# Patient Record
Sex: FEMALE | Race: WHITE | NOT HISPANIC OR LATINO | URBAN - NONMETROPOLITAN AREA
[De-identification: names, ages, dates, MRNs, and addresses within clinical notes are randomized per-mention and may not be internally consistent; named-entity substitution may affect disease eponyms.]

---

## 2019-10-01 ENCOUNTER — APPOINTMENT (RX ONLY)
Dept: URBAN - NONMETROPOLITAN AREA CLINIC 31 | Facility: CLINIC | Age: 39
Setting detail: DERMATOLOGY
End: 2019-10-01

## 2019-10-01 DIAGNOSIS — B37.2 CANDIDIASIS OF SKIN AND NAIL: ICD-10-CM

## 2019-10-01 PROCEDURE — ? PRESCRIPTION

## 2019-10-01 PROCEDURE — ? COUNSELING

## 2019-10-01 RX ORDER — FLUCONAZOLE 150 MG/1
TABLET ORAL
Qty: 5 | Refills: 0 | Status: ERX | COMMUNITY
Start: 2019-10-01

## 2019-10-01 RX ADMIN — FLUCONAZOLE: 150 TABLET ORAL at 00:00

## 2019-10-01 NOTE — PROCEDURE: COUNSELING
Detail Level: Simple
Patient Specific Counseling (Will Not Stick From Patient To Patient): Confirmed with patient she is no longer taking orap due to contraindication with fluconazole. She states she “hasn’t taken in years.”

## 2020-04-03 ENCOUNTER — RX ONLY (OUTPATIENT)
Age: 40
Setting detail: RX ONLY
End: 2020-04-03

## 2020-04-03 ENCOUNTER — APPOINTMENT (RX ONLY)
Dept: URBAN - NONMETROPOLITAN AREA CLINIC 31 | Facility: CLINIC | Age: 40
Setting detail: DERMATOLOGY
End: 2020-04-03

## 2020-04-03 DIAGNOSIS — L01.01 NON-BULLOUS IMPETIGO: ICD-10-CM

## 2020-04-03 DIAGNOSIS — L21.8 OTHER SEBORRHEIC DERMATITIS: ICD-10-CM

## 2020-04-03 DIAGNOSIS — L94.2 CALCINOSIS CUTIS: ICD-10-CM

## 2020-04-03 DIAGNOSIS — I73.00 RAYNAUD'S SYNDROME WITHOUT GANGRENE: ICD-10-CM

## 2020-04-03 DIAGNOSIS — L98.1 FACTITIAL DERMATITIS: ICD-10-CM

## 2020-04-03 PROBLEM — J30.1 ALLERGIC RHINITIS DUE TO POLLEN: Status: ACTIVE | Noted: 2020-04-03

## 2020-04-03 PROBLEM — F41.9 ANXIETY DISORDER, UNSPECIFIED: Status: ACTIVE | Noted: 2020-04-03

## 2020-04-03 PROBLEM — J45.909 UNSPECIFIED ASTHMA, UNCOMPLICATED: Status: ACTIVE | Noted: 2020-04-03

## 2020-04-03 PROCEDURE — ? COUNSELING

## 2020-04-03 PROCEDURE — ? PATIENT SPECIFIC COUNSELING

## 2020-04-03 PROCEDURE — 99214 OFFICE O/P EST MOD 30 MIN: CPT | Mod: 95

## 2020-04-03 RX ORDER — KETOCONAZOLE 20 MG/ML
SHAMPOO TOPICAL
Qty: 1 | Refills: 6 | Status: ERX | COMMUNITY
Start: 2020-04-03

## 2020-04-03 RX ORDER — CHLORHEXIDINE GLUCONATE 4 G/100ML
SOLUTION TOPICAL
Qty: 1 | Refills: 2 | Status: ERX | COMMUNITY
Start: 2020-04-03

## 2020-04-03 RX ORDER — MUPIROCIN 20 MG/G
OINTMENT TOPICAL
Qty: 1 | Refills: 3 | Status: ERX | COMMUNITY
Start: 2020-04-03

## 2020-04-03 ASSESSMENT — LOCATION SIMPLE DESCRIPTION DERM
LOCATION SIMPLE: RIGHT CHEEK
LOCATION SIMPLE: LEFT CHEEK
LOCATION SIMPLE: RIGHT THUMB
LOCATION SIMPLE: RIGHT SHOULDER
LOCATION SIMPLE: HAIR
LOCATION SIMPLE: LEFT HAND
LOCATION SIMPLE: RIGHT ANTERIOR NECK

## 2020-04-03 ASSESSMENT — LOCATION ZONE DERM
LOCATION ZONE: SCALP
LOCATION ZONE: FACE
LOCATION ZONE: FINGER
LOCATION ZONE: ARM
LOCATION ZONE: NECK
LOCATION ZONE: HAND

## 2020-04-03 ASSESSMENT — LOCATION DETAILED DESCRIPTION DERM
LOCATION DETAILED: LEFT SUPERIOR LATERAL BUCCAL CHEEK
LOCATION DETAILED: RIGHT INFERIOR LATERAL NECK
LOCATION DETAILED: HAIR
LOCATION DETAILED: RIGHT CENTRAL BUCCAL CHEEK
LOCATION DETAILED: LEFT ULNAR DORSAL HAND
LOCATION DETAILED: RIGHT DISTAL RADIAL THUMB
LOCATION DETAILED: RIGHT INFERIOR MEDIAL MALAR CHEEK
LOCATION DETAILED: RIGHT ANTERIOR SHOULDER

## 2020-04-03 NOTE — PROCEDURE: PATIENT SPECIFIC COUNSELING
Detail Level: Detailed
ketoconazole shampoo QOD
Doxyxycline 100mg BID x 10 days\\n\\ndecolinize with\\n mupirocin nares TID x5 days, repeat monthly\\nchlorhexadine wash neck down 2-3x weekly
swelling as well\\nmay consider work-up for SLE and Scleroderma in the future but will treat infection now
than in the past.  Cost has been a barrier for her from receiving mental health support
patient knows there is an anxiety component\\nI advised she see what services she can participate in with [facility], given their new initiative it may be more affordable

## 2020-04-03 NOTE — PROCEDURE: COUNSELING
Detail Level: Detailed
Patient Specific Counseling (Will Not Stick From Patient To Patient): discussed work-up for AI disease\\npatient deferring for now to stay away from Health care settings\\n\\ndenies other systemic symptoms, but with hairloss, raynauds, ?calcinosis cutis, and memory issues - should strongly consider work up for SLE

## 2020-04-03 NOTE — HPI: RASH
What Type Of Note Output Would You Prefer (Optional)?: Standard Output
Is The Patient Presenting As Previously Scheduled?: Yes
How Severe Is Your Rash?: severe
Is This A New Presentation, Or A Follow-Up?: Rash
Additional History: States she’s seen different doctors for it but no one has been able to make it go away. She has chronic Lyme disease. \\nStates these episodes come and go. They clear up but then she flares.

## 2020-05-05 ENCOUNTER — RX ONLY (OUTPATIENT)
Age: 40
Setting detail: RX ONLY
End: 2020-05-05

## 2020-05-05 RX ORDER — PERMETHRIN 50 MG/G
CREAM TOPICAL
Qty: 1 | Refills: 1 | Status: ERX | COMMUNITY
Start: 2020-05-05

## 2020-08-31 ENCOUNTER — APPOINTMENT (RX ONLY)
Dept: URBAN - NONMETROPOLITAN AREA CLINIC 31 | Facility: CLINIC | Age: 40
Setting detail: DERMATOLOGY
End: 2020-08-31

## 2020-08-31 DIAGNOSIS — D49.2 NEOPLASM OF UNSPECIFIED BEHAVIOR OF BONE, SOFT TISSUE, AND SKIN: ICD-10-CM

## 2020-08-31 DIAGNOSIS — L90.5 SCAR CONDITIONS AND FIBROSIS OF SKIN: ICD-10-CM

## 2020-08-31 DIAGNOSIS — L81.1 CHLOASMA: ICD-10-CM

## 2020-08-31 PROBLEM — F32.9 MAJOR DEPRESSIVE DISORDER, SINGLE EPISODE, UNSPECIFIED: Status: ACTIVE | Noted: 2020-08-31

## 2020-08-31 PROCEDURE — ? PATIENT SPECIFIC COUNSELING

## 2020-08-31 PROCEDURE — 99213 OFFICE O/P EST LOW 20 MIN: CPT | Mod: 25

## 2020-08-31 PROCEDURE — ? BIOPSY BY SHAVE METHOD

## 2020-08-31 PROCEDURE — ? COUNSELING

## 2020-08-31 PROCEDURE — ? IN-HOUSE DISPENSING PHARMACY

## 2020-08-31 PROCEDURE — ? MEDICAL CONSULTATION: FRACTIONAL RESURFACING

## 2020-08-31 PROCEDURE — 11102 TANGNTL BX SKIN SINGLE LES: CPT

## 2020-08-31 ASSESSMENT — LOCATION ZONE DERM
LOCATION ZONE: FINGER
LOCATION ZONE: FACE

## 2020-08-31 ASSESSMENT — LOCATION DETAILED DESCRIPTION DERM
LOCATION DETAILED: INFERIOR MID FOREHEAD
LOCATION DETAILED: DORSAL INTERPHALANGEAL JOINT RIGHT THUMB

## 2020-08-31 ASSESSMENT — LOCATION SIMPLE DESCRIPTION DERM
LOCATION SIMPLE: INFERIOR FOREHEAD
LOCATION SIMPLE: RIGHT THUMB

## 2020-08-31 NOTE — PROCEDURE: BIOPSY BY SHAVE METHOD
Hemostasis: Electrocautery
Validate Lesion Size: No
Electrodesiccation And Curettage Text: The wound bed was treated with electrodesiccation and curettage after the biopsy was performed.
Anesthesia Type: 1% lidocaine with epinephrine
Billing Type: Third-Party Bill
Electrodesiccation Text: The wound bed was treated with electrodesiccation after the biopsy was performed.
Dressing: Band-Aid
Biopsy Type: H and E
Depth Of Biopsy: dermis
Wound Care: Aquaphor
Cryotherapy Text: The wound bed was treated with cryotherapy after the biopsy was performed.
Information: Selecting Yes will display possible errors in your note based on the variables you have selected. This validation is only offered as a suggestion for you. PLEASE NOTE THAT THE VALIDATION TEXT WILL BE REMOVED WHEN YOU FINALIZE YOUR NOTE. IF YOU WANT TO FAX A PRELIMINARY NOTE YOU WILL NEED TO TOGGLE THIS TO 'NO' IF YOU DO NOT WANT IT IN YOUR FAXED NOTE.
Silver Nitrate Text: The wound bed was treated with silver nitrate after the biopsy was performed.
Biopsy Method: Dermablade
Curettage Text: The wound bed was treated with curettage after the biopsy was performed.
Was A Bandage Applied: Yes
Consent: Written consent was obtained and risks were reviewed including but not limited to scarring, infection, bleeding, scabbing, incomplete removal, nerve damage and allergy to anesthesia.
Anesthesia Volume In Cc: 1
Notification Instructions: Patient will be notified of biopsy results. However, patient instructed to call the office if not contacted within 2 weeks.
Additional Anesthesia Volume In Cc (Will Not Render If 0): 0
Post-Care Instructions: I reviewed with the patient in detail post-care instructions. Patient is to keep the biopsy site dry overnight, and then apply bacitracin twice daily until healed. Patient may apply hydrogen peroxide soaks to remove any crusting.
Type Of Destruction Used: Curettage
Detail Level: Detailed

## 2020-08-31 NOTE — PROCEDURE: IN-HOUSE DISPENSING PHARMACY
Product 21 Price/Unit (In Dollars): 45.00
Product 2 Price/Unit (In Dollars): 50.00
Product 59 Units Dispensed: 0
Product 73 Unit Type: mg
Render Refills If Set To 0: Yes
Product 31 Unit Type: grams
Name Of Product 29: Triamcinolone Cream (120 grams)
Product 43 Application Directions: Spray on scalp once daily
Product 44 Refills: 1
Name Of Product 45: Skin brightening hydroquinone 8% combination sensitive skin
Product 28 Price/Unit (In Dollars): 30.00
Product 44 Price/Unit (In Dollars): 90.00
Product 26 Application Directions: Apply bid for 2 weeks
Name Of Product 48: Seborrheic Dermatitis Shampoo
Name Of Product 3: Adapalene 0.3% Gel
Product 32 Refills: 5
Product 24 Amount/Unit (Numbers Only): 60
Product 44 Application Directions: Apply lightly once nightly
Product 21 Amount/Unit (Numbers Only): 120
Product 29 Refills: 2
Product 5 Application Directions: Wash face daily
Name Of Product 6: Clindamycin Gel
Product 29 Application Directions: Apply to affected areas bid for two weeks per month.
Product 33 Application Directions: AppLy to scalp 3 times per week let sit 5 minutes then rinse
Product 9 Refills: 3
Name Of Product 26: Ketoconazole and hydrocortisone cream
Name Of Product 2: Acne Triple Gel Combination
Product 27 Application Directions: Apply twice daily to affected areas for up to 2 weeks/month PRN.
Product 8 Amount/Unit (Numbers Only): 30
Product 31 Price/Unit (In Dollars): 40.00
Name Of Product 46: Anti fungal nail solution
Name Of Product 12: Rosacea Triple Combination Gel
Product 22 Application Directions: Apply twice a day for 2 weeks then stop for 1 week.
Product 2 Application Directions: Apply a pea-sized amount to entire face once nightly.
Name Of Product 22: Clobetasol Cream
Name Of Product 42: Skin Brightening Hydroquinone 8% Combination
Name Of Product 51: Hydrating tretinoin 0.05%
Name Of Product 21: Anti-Fungal Shampoo
Product 11 Application Directions: Apply daily in AM
Name Of Product 25: Fluocinonide Cream
Name Of Product 32: Tacrolimus 0.1% Ointment
Name Of Product 30: Clobetasol solution
Product 10 Price/Unit (In Dollars): 50
Product 21 Application Directions: Lather to ear 3 times weekly as needed
Product 32 Unit Type: tube(s)
Product 1 Application Directions: Wash face every morning
Product 42 Application Directions: Apply topically to face once nightly, wash in AM. Wear sunscreen
Name Of Product 1: Sodium sulfacetamide 8%
Product 30 Application Directions: Apply BID to scalp x 2 weeks
Name Of Product 31: Urea 40% cream
Product 9 Application Directions: Apply pea sized amount to affected areas 2-3 nights per wk, work up to [facility].
Name Of Product 8: Tretinoin 0.025% / Clindamycin Combination Cream
Name Of Product 23: Clobetasol Ointment
Product 44 Amount/Unit (Numbers Only): 240
Product 5 Price/Unit (In Dollars): 0.00
Product 46 Application Directions: Apply to nails daily
Name Of Product 41: AK Imiquimod Gel
Product 24 Application Directions: Spray on shoulders after shower
Product 31 Application Directions: Apply to affected area twice daily for two weeks.
Name Of Product 9: Hydrating 0.05% Tretinoin Cream
Product 10 Application Directions: Apply to face in AM.
Name Of Product 7: Dapsone 6% Gel
Name Of Product 4: BP 2.5% / Clindamycin Combination Gel
Detail Level: Zone
Name Of Product 44: Anti-aging Body Tretinoin
Name Of Product 33: Seborrheic Dermatitis Shampoo 120ml
Name Of Product 5: BP 8% Acne Wash
Name Of Product 43: Alopecia Topical Solution for Men
Product 3 Application Directions: Apply pea sized amount nightly
Product 29 Price/Unit (In Dollars): 65.00
Name Of Product 11: Metronidazole Gel
Product 4 Application Directions: Apply once daily in the AM
Name Of Product 13: Rosacea Cream
Product 23 Application Directions: Apply to affected areas daily up to two weeks per month
Product 48 Application Directions: Wash scalp 3 times weekly let sit for 5 minutes then rinse
Product 12 Application Directions: Apply pea sized amount to face BID
Name Of Product 28: Triamcinolone Cream (30 gram)
Name Of Product 24: Fluocinolone Scalp and Body Oil
Name Of Product 27: Hydrocortisone 2.5% / Tranilast 0.5% / Levo 2%
Name Of Product 10: Female Hormonal Acne Gel

## 2020-09-17 ENCOUNTER — APPOINTMENT (RX ONLY)
Dept: URBAN - NONMETROPOLITAN AREA CLINIC 31 | Facility: CLINIC | Age: 40
Setting detail: DERMATOLOGY
End: 2020-09-17

## 2020-09-17 DIAGNOSIS — L98.8 OTHER SPECIFIED DISORDERS OF THE SKIN AND SUBCUTANEOUS TISSUE: ICD-10-CM

## 2020-09-17 PROCEDURE — ? BOTOX

## 2020-09-17 NOTE — PROCEDURE: BOTOX
Post-Care Instructions: Patient instructed to not lie down for 4 hours and limit physical activity for 24 hours.
Price (Use Numbers Only, No Special Characters Or $): 906
Total Units: 20
Consent: Written consent obtained. Risks include but not limited to lid/brow ptosis, bruising, swelling, diplopia, temporary effect, incomplete chemical denervation.
Detail Level: Detailed
Map Statement: Please see attached map for locations and injection amounts.
normal behavior/normal affect

## 2020-09-25 ENCOUNTER — APPOINTMENT (RX ONLY)
Dept: URBAN - NONMETROPOLITAN AREA CLINIC 31 | Facility: CLINIC | Age: 40
Setting detail: DERMATOLOGY
End: 2020-09-25

## 2020-09-25 VITALS — TEMPERATURE: 97.1 F

## 2020-09-25 DIAGNOSIS — L98.8 OTHER SPECIFIED DISORDERS OF THE SKIN AND SUBCUTANEOUS TISSUE: ICD-10-CM

## 2020-09-25 PROCEDURE — ? PATIENT SPECIFIC COUNSELING

## 2020-09-25 NOTE — PROCEDURE: PATIENT SPECIFIC COUNSELING
Detail Level: Detailed
Patient presented today to have her lips “fixed after filler”. She states the filler “popped” while being placed in the lip and she is not certain if this led to an unevenness in the areas. After reviewing the charts there is only documentation of Botox being completed at this visit. Billing confirmed that only Botox was charged for the patient. The patient reports she is not sure the differences between Botox and filler and may not be certain what was injected. \\n\\nI discussed with patient that is would be most helpful if she follow up with Dr. Ballard for improved continuity of care and optimal results. I chose not to proceed with additional lip filler as we do not have documentation that this was the treatment done at her last visit.

## 2020-10-21 ENCOUNTER — APPOINTMENT (RX ONLY)
Dept: URBAN - NONMETROPOLITAN AREA CLINIC 31 | Facility: CLINIC | Age: 40
Setting detail: DERMATOLOGY
End: 2020-10-21

## 2020-10-21 VITALS — TEMPERATURE: 96.9 F

## 2020-10-21 DIAGNOSIS — L98.8 OTHER SPECIFIED DISORDERS OF THE SKIN AND SUBCUTANEOUS TISSUE: ICD-10-CM

## 2020-10-21 PROCEDURE — ? PATIENT SPECIFIC COUNSELING

## 2020-10-21 PROCEDURE — ? BOTOX

## 2020-10-21 NOTE — PROCEDURE: BOTOX
Price (Use Numbers Only, No Special Characters Or $): 15
Map Statement: Please see attached map for locations and injection amounts.
Total Units: 6
Lot #: [id number]
Consent: Written consent obtained. Risks include but not limited to lid/brow ptosis, bruising, swelling, diplopia, temporary effect, incomplete chemical denervation.
Detail Level: Detailed
Post-Care Instructions: Patient instructed to not lie down for 4 hours and limit physical activity for 24 hours.
Expiration Date (Month Year): 05/2023

## 2023-08-17 NOTE — PROCEDURE: COUNSELING
Detail Level: Zone Calcipotriene Pregnancy And Lactation Text: This medication has not been proven safe during pregnancy. It is unknown if this medication is excreted in breast milk.